# Patient Record
Sex: FEMALE | Race: WHITE | Employment: PART TIME | URBAN - METROPOLITAN AREA
[De-identification: names, ages, dates, MRNs, and addresses within clinical notes are randomized per-mention and may not be internally consistent; named-entity substitution may affect disease eponyms.]

---

## 2022-08-26 ENCOUNTER — APPOINTMENT (OUTPATIENT)
Dept: GENERAL RADIOLOGY | Age: 42
End: 2022-08-26
Payer: MEDICAID

## 2022-08-26 ENCOUNTER — HOSPITAL ENCOUNTER (EMERGENCY)
Age: 42
Discharge: HOME | End: 2022-08-26
Attending: EMERGENCY MEDICINE
Payer: MEDICAID

## 2022-08-26 VITALS
TEMPERATURE: 98.7 F | SYSTOLIC BLOOD PRESSURE: 138 MMHG | HEIGHT: 68 IN | RESPIRATION RATE: 16 BRPM | DIASTOLIC BLOOD PRESSURE: 82 MMHG | WEIGHT: 287.6 LBS | HEART RATE: 81 BPM | BODY MASS INDEX: 43.59 KG/M2 | OXYGEN SATURATION: 98 %

## 2022-08-26 DIAGNOSIS — F41.1 ANXIETY STATE: Primary | ICD-10-CM

## 2022-08-26 DIAGNOSIS — L01.00 IMPETIGO: ICD-10-CM

## 2022-08-26 LAB
A/G RATIO: 1.8 (ref 1.1–2.2)
ALBUMIN SERPL-MCNC: 4.4 G/DL (ref 3.4–5)
ALP BLD-CCNC: 72 U/L (ref 40–129)
ALT SERPL-CCNC: 29 U/L (ref 10–40)
ANION GAP SERPL CALCULATED.3IONS-SCNC: 13 MMOL/L (ref 3–16)
AST SERPL-CCNC: 17 U/L (ref 15–37)
BASOPHILS ABSOLUTE: 0.1 K/UL (ref 0–0.2)
BASOPHILS RELATIVE PERCENT: 0.5 %
BILIRUB SERPL-MCNC: 0.4 MG/DL (ref 0–1)
BUN BLDV-MCNC: 12 MG/DL (ref 7–20)
CALCIUM SERPL-MCNC: 9.4 MG/DL (ref 8.3–10.6)
CHLORIDE BLD-SCNC: 104 MMOL/L (ref 99–110)
CO2: 21 MMOL/L (ref 21–32)
CREAT SERPL-MCNC: 0.7 MG/DL (ref 0.6–1.1)
EOSINOPHILS ABSOLUTE: 0.2 K/UL (ref 0–0.6)
EOSINOPHILS RELATIVE PERCENT: 1.1 %
GFR AFRICAN AMERICAN: >60
GFR NON-AFRICAN AMERICAN: >60
GLUCOSE BLD-MCNC: 99 MG/DL (ref 70–99)
HCG QUALITATIVE: NEGATIVE
HCT VFR BLD CALC: 44.7 % (ref 36–48)
HEMOGLOBIN: 14.7 G/DL (ref 12–16)
IMMATURE GRANULOCYTES #: 0 K/UL (ref 0–0.2)
IMMATURE GRANULOCYTES %: 0.3 %
LYMPHOCYTES ABSOLUTE: 2.1 K/UL (ref 1–5.1)
LYMPHOCYTES RELATIVE PERCENT: 15.5 %
MCH RBC QN AUTO: 29.3 PG (ref 26–34)
MCHC RBC AUTO-ENTMCNC: 32.9 G/DL (ref 32–36.4)
MCV RBC AUTO: 89 FL (ref 80–100)
MONOCYTES ABSOLUTE: 0.9 K/UL (ref 0–1.3)
MONOCYTES RELATIVE PERCENT: 7.1 %
NEUTROPHILS ABSOLUTE: 10.1 K/UL (ref 1.7–7.7)
NEUTROPHILS RELATIVE PERCENT: 75.5 %
PDW BLD-RTO: 13.3 % (ref 12.4–15.4)
PLATELET # BLD: 240 K/UL (ref 135–450)
PMV BLD AUTO: 11.5 FL (ref 5–10.5)
POTASSIUM REFLEX MAGNESIUM: 3.9 MMOL/L (ref 3.5–5.1)
RBC # BLD: 5.02 M/UL (ref 4–5.2)
SODIUM BLD-SCNC: 138 MMOL/L (ref 136–145)
TOTAL PROTEIN: 6.9 G/DL (ref 6.4–8.2)
TROPONIN: <0.01 NG/ML
WBC # BLD: 13.3 K/UL (ref 4–11)

## 2022-08-26 PROCEDURE — 93005 ELECTROCARDIOGRAM TRACING: CPT | Performed by: EMERGENCY MEDICINE

## 2022-08-26 PROCEDURE — 36415 COLL VENOUS BLD VENIPUNCTURE: CPT

## 2022-08-26 PROCEDURE — 71046 X-RAY EXAM CHEST 2 VIEWS: CPT

## 2022-08-26 PROCEDURE — 99285 EMERGENCY DEPT VISIT HI MDM: CPT

## 2022-08-26 PROCEDURE — 84484 ASSAY OF TROPONIN QUANT: CPT

## 2022-08-26 PROCEDURE — 84439 ASSAY OF FREE THYROXINE: CPT

## 2022-08-26 PROCEDURE — 84443 ASSAY THYROID STIM HORMONE: CPT

## 2022-08-26 PROCEDURE — 80053 COMPREHEN METABOLIC PANEL: CPT

## 2022-08-26 PROCEDURE — 85025 COMPLETE CBC W/AUTO DIFF WBC: CPT

## 2022-08-26 PROCEDURE — 6370000000 HC RX 637 (ALT 250 FOR IP): Performed by: EMERGENCY MEDICINE

## 2022-08-26 PROCEDURE — 84703 CHORIONIC GONADOTROPIN ASSAY: CPT

## 2022-08-26 RX ORDER — MUPIROCIN CALCIUM 20 MG/G
CREAM TOPICAL 3 TIMES DAILY
Qty: 30 G | Refills: 0 | Status: SHIPPED | OUTPATIENT
Start: 2022-08-26 | End: 2022-09-25

## 2022-08-26 RX ORDER — HYDROXYZINE HYDROCHLORIDE 25 MG/1
25 TABLET, FILM COATED ORAL EVERY 8 HOURS PRN
Qty: 30 TABLET | Refills: 0 | Status: SHIPPED | OUTPATIENT
Start: 2022-08-26 | End: 2022-09-05

## 2022-08-26 RX ORDER — HYDROXYZINE PAMOATE 25 MG/1
25 CAPSULE ORAL ONCE
Status: COMPLETED | OUTPATIENT
Start: 2022-08-26 | End: 2022-08-26

## 2022-08-26 RX ADMIN — HYDROXYZINE PAMOATE 25 MG: 25 CAPSULE ORAL at 18:36

## 2022-08-26 ASSESSMENT — PAIN SCALES - GENERAL: PAINLEVEL_OUTOF10: 0

## 2022-08-26 ASSESSMENT — PAIN - FUNCTIONAL ASSESSMENT
PAIN_FUNCTIONAL_ASSESSMENT: NONE - DENIES PAIN
PAIN_FUNCTIONAL_ASSESSMENT: 0-10

## 2022-08-26 NOTE — DISCHARGE INSTRUCTIONS
You have been prescribed medication, hydroxyzine, that causes drowsiness. While this is not a problem under normal circumstances, when taken with alcohol or while driving or operating heavy machinery, this medicine could cause you to become too sleepy and/or hurt yourself or others. Therefore, it is very important that you DO NOT DRIVE, DRINK ALCOHOL, OR OPERATE HEAVY MACHINERY WHILE TAKING THE MEDICINE(S) LISTED ABOVE.     Bactroban topically, as prescribed

## 2022-08-26 NOTE — ED PROVIDER NOTES
[facility]    CHIEF COMPLAINT  Anxiety (1 hour ago patient started to have a hot flash and feel shaky. Her watch read A-fib. She started having anxiety. She took her watch off. She states, she is feeling better. She has hx of anxiety )       HISTORY OF PRESENT ILLNESS  Allegra Beach is a 38 y.o. female who presents to the ED with complaint of anxiety and concerned that her watch indicated that she was in atrial fibrillation. The patient reports chronic anxiety for which she takes Celexa. She is also prescribed a benzodiazepine for breakthrough symptoms of anxiety, however she declines to take it setting history of substance abuse in her family. She had some recent life stressors centered around an ill parent. She denies any suicidal ideation, homicidal ideation, or hallucinations. Today, she was feeling quite anxious and her watch indicated that she was in atrial fibrillation. She is unsure if she had palpitations but did state that her heart rate was fast as her watch indicated heart rate just over 100. She had a slight burning sensation in her chest during the episode but is chest pain-free at this time. She denies fever, shortness of breath, nausea, vomiting, diarrhea, abdominal pain, leg swelling, calf pain. No other complaints, modifying factors or associated symptoms.      I have reviewed the following from the nursing documentation:    Past Medical History:   Diagnosis Date    Anxiety     Seasonal allergies      Past Surgical History:   Procedure Laterality Date    PRE-MALIGNANT / BENIGN SKIN LESION EXCISION  2010    right cheek below eye    WISDOM TOOTH EXTRACTION  2009     Family History   Problem Relation Age of Onset    High Blood Pressure Mother      Social History     Socioeconomic History    Marital status:      Spouse name: Not on file    Number of children: Not on file    Years of education: Not on file    Highest education level: Not on file Occupational History    Not on file   Tobacco Use    Smoking status: Some Days     Types: Cigarettes     Last attempt to quit: 2009     Years since quittin.1    Smokeless tobacco: Never   Substance and Sexual Activity    Alcohol use: Yes     Alcohol/week: 1.0 standard drink     Types: 1 Standard drinks or equivalent per week     Comment: occ    Drug use: No    Sexual activity: Yes   Other Topics Concern    Not on file   Social History Narrative    Not on file     Social Determinants of Health     Financial Resource Strain: Not on file   Food Insecurity: Not on file   Transportation Needs: Not on file   Physical Activity: Not on file   Stress: Not on file   Social Connections: Not on file   Intimate Partner Violence: Not on file   Housing Stability: Not on file     No current facility-administered medications for this encounter. Current Outpatient Medications   Medication Sig Dispense Refill    hydrOXYzine HCl (ATARAX) 25 MG tablet Take 1 tablet by mouth every 8 hours as needed for Itching or Anxiety 30 tablet 0    mupirocin (BACTROBAN) 2 % cream Apply topically 3 times daily Apply topically 3 times daily. 30 g 0    montelukast (SINGULAIR) 10 MG tablet Take 10 mg by mouth nightly      citalopram (CELEXA) 10 MG tablet Take 40 mg by mouth daily      fexofenadine (ALLEGRA) 180 MG tablet Take 180 mg by mouth daily      fluticasone (FLONASE) 50 MCG/ACT nasal spray 1 spray by Nasal route daily      cetirizine (ZYRTEC) 10 MG tablet Take 10 mg by mouth daily       Allergies   Allergen Reactions    Augmentin [Amoxicillin-Pot Clavulanate]     Bactrim [Sulfamethoxazole-Trimethoprim]     Codeine     Keflex [Cephalexin]        REVIEW OF SYSTEMS  10 systems reviewed, pertinent positives and negatives per HPI, otherwise noted to be negative.     PHYSICAL EXAM  ED Triage Vitals [22 1819]   BP Temp Temp Source Heart Rate Resp SpO2 Height Weight   (!) 157/96 98.3 °F (36.8 °C) Oral 92 18 98 % 5' 8\" (1.727 m) 287 lb 9.6 oz (130.5 kg)     General appearance: Awake and alert. Cooperative. No acute distress. HENT: Normocephalic. Atraumatic. Mucous membranes are moist.  Neck: Supple. Eyes: PERRL. EOMI. Heart/Chest: RRR. No murmurs. Lungs: Respirations unlabored. CTAB. Good air exchange. Speaking comfortably in full sentences. Abdomen: Soft. Non-tender. Non-distended. No rebound or guarding. Musculoskeletal: No extremity edema. No calf tenderness or palpable cord. No deformity. No tenderness in the extremities. All extremities neurovascularly intact. Skin: Warm and dry. Diffuse circular erythematous small maculopapular lesions on the upper and lower extremities with surrounding erythema, no induration or fluctuance, no purulent discharge. Neurological: Alert and oriented. CN II-XII intact. Strength 5/5 bilateral upper and lower extremities. Sensation intact to light touch. Gait normal.  Psychiatric: Mood/affect: normal      LABS  I have reviewed all labs for this visit.    Results for orders placed or performed during the hospital encounter of 08/26/22   CBC with Auto Differential   Result Value Ref Range    WBC 13.3 (H) 4.0 - 11.0 K/uL    RBC 5.02 4.00 - 5.20 M/uL    Hemoglobin 14.7 12.0 - 16.0 g/dL    Hematocrit 44.7 36.0 - 48.0 %    MCV 89.0 80.0 - 100.0 fL    MCH 29.3 26.0 - 34.0 pg    MCHC 32.9 32.0 - 36.4 g/dL    RDW 13.3 12.4 - 15.4 %    Platelets 535 493 - 318 K/uL    MPV 11.5 (H) 5.0 - 10.5 fL    Neutrophils % 75.5 %    Immature Granulocytes % 0.3 %    Lymphocytes % 15.5 %    Monocytes % 7.1 %    Eosinophils % 1.1 %    Basophils % 0.5 %    Neutrophils Absolute 10.1 (H) 1.7 - 7.7 K/uL    Immature Granulocytes # 0.0 0.0 - 0.2 K/uL    Lymphocytes Absolute 2.1 1.0 - 5.1 K/uL    Monocytes Absolute 0.9 0.0 - 1.3 K/uL    Eosinophils Absolute 0.2 0.0 - 0.6 K/uL    Basophils Absolute 0.1 0.0 - 0.2 K/uL   Comprehensive Metabolic Panel w/ Reflex to MG   Result Value Ref Range    Sodium 138 136 - 145 mmol/L Potassium reflex Magnesium 3.9 3.5 - 5.1 mmol/L    Chloride 104 99 - 110 mmol/L    CO2 21 21 - 32 mmol/L    Anion Gap 13 3 - 16    Glucose 99 70 - 99 mg/dL    BUN 12 7 - 20 mg/dL    Creatinine 0.7 0.6 - 1.1 mg/dL    GFR Non-African American >60 >60    GFR African American >60 >60    Calcium 9.4 8.3 - 10.6 mg/dL    Total Protein 6.9 6.4 - 8.2 g/dL    Albumin 4.4 3.4 - 5.0 g/dL    Albumin/Globulin Ratio 1.8 1.1 - 2.2    Total Bilirubin 0.4 0.0 - 1.0 mg/dL    Alkaline Phosphatase 72 40 - 129 U/L    ALT 29 10 - 40 U/L    AST 17 15 - 37 U/L   TSH with Reflex   Result Value Ref Range    TSH 7.05 (H) 0.27 - 4.20 uIU/mL   Troponin   Result Value Ref Range    Troponin <0.01 <0.01 ng/mL   HCG Qualitative, Serum   Result Value Ref Range    hCG Qual Negative Detects HCG level >10 MIU/mL   T4, Free   Result Value Ref Range    T4 Free 1.2 0.9 - 1.8 ng/dL   EKG 12 Lead   Result Value Ref Range    Ventricular Rate 85 BPM    Atrial Rate 85 BPM    P-R Interval 162 ms    QRS Duration 90 ms    Q-T Interval 386 ms    QTc Calculation (Bazett) 459 ms    P Axis 14 degrees    R Axis 16 degrees    T Axis 26 degrees    Diagnosis       Normal sinus rhythmNormal ECGNo previous ECGs availableConfirmed by Robyn Ma MD, Samara Lucero ([id number]) on 8/28/2022 1:52:01 PM       RADIOLOGY  I have reviewed all radiographic studies for this visit. XR CHEST (2 VW)   Final Result   No acute process. ECG  EKG interpreted by myself. Rate: normal  Rhythm: NSR  Axis: normal  Intervals: within normal limits  ST Segments: no acute abnormality  T waves: no acute abnormality  Comparison: no prior   Impression: NSR with no acute abnormality     ED COURSE/MDM  Patient seen and evaluated. Old records reviewed. Labs and imaging reviewed and results discussed with patient/family to extent possible. This is a well-appearing 19-year-old female who presents with complaint of anxiety and a dysrhythmia reported by her smart watch.   On arrival the patient is slightly hypertensive with otherwise reassuring vital signs. Cardiac exam reveals a regular rate and rhythm, and EKG shows normal sinus rhythm with no acute abnormality. The patient complained of some burning sensation in her chest during the episode which I think is likely to be anxiety related however will obtain a chest x-ray and troponin. Will obtain usual screening laboratory studies. Discussed with patient use of hydroxyzine and lieu of a benzodiazepine for her symptoms of anxiety and she consents to trial it in the emergency department. Anticipate will send her home with a prescription for the same, and precautions regarding the sedating nature of this medication were communicated. We will keep the patient on the monitor in the emergency department to assess for dysrhythmias. Ultimately, however, if symptoms are able to be controlled and screening laboratory studies are reassuring, anticipate will discharge home with close follow-up with PCP in several days. If patient develops palpitations or other similar related symptoms, or if her smart watch again alerts to dysrhythmia, patient may benefit from Holter monitoring in the outpatient setting. Patient feeling much better after administration of hydroxyzine. Renal panel no significant electrolyte abnormalities or creatinine elevation. CBC mild leukocytosis, clinically insignificant with no source of an infection. No anemia. Troponin within normal limits. Pregnancy test negative. TSH was elevated however free T4 within normal limits. Patient manifested no dysrhythmias while in the emergency department. Separately, the patient was noted to have a number of skin lesions that look consistent with bug bites likely with superimposed impetigo. Will prescribe Bactroban. Discharge as above with usual strict return precautions communicated. Is this patient to be included in the SEP-1 Core Measure?   No   Exclusion criteria - the patient is NOT to be included for SEP-1 Core Measure due to: Infection is not suspected    IGracy MD, am the primary clinician of record. During the patient's ED course, the patient was given:  Medications   hydrOXYzine pamoate (VISTARIL) capsule 25 mg (25 mg Oral Given 8/26/22 6828)        All questions were answered and the patient/family expressed understanding and agreement with the plan. PROCEDURES  None    CRITICAL CARE  N/A    CLINICAL IMPRESSION  1. Anxiety state    2. Impetigo        DISPOSITION   discharge     Gracy Cordova MD    Note: This chart was created using voice recognition dictation software. Efforts were made by me to ensure accuracy, however some errors may be present due to limitations of this technology and occasionally words are not transcribed correctly.         Gracy Cordova MD  08/29/22 [id number]

## 2022-08-26 NOTE — ED PROVIDER NOTES
[address] [facility] called, stating patient's insurance will not cover Bactroban cream but will cover bactroban ointment. I authorized the change.      Yasmin Pat MD  08/26/22 [id number]

## 2022-08-27 LAB
T4 FREE: 1.2 NG/DL (ref 0.9–1.8)
TSH REFLEX: 7.05 UIU/ML (ref 0.27–4.2)

## 2022-08-27 NOTE — ED NOTES
Initial contact with patient upon discharge. Patient is sinus rhythm on cardiac monitor. Patient denies pain and states she feels better. Discharge instructions reviewed with patient and verbalized understanding, denies further questions and successful teach back occurred. Offered wheelchair for discharge and declined. Discharged ambulatory with steady gait to ED lobby. Written discharge instructions provided to patient. Prescriptions x2 sent electronically to patient's pharmacy.      Michelle Miguel RN  08/26/22 [id number]

## 2022-08-27 NOTE — RESULT ENCOUNTER NOTE
Culture reviewed, the patient's TSH was elevated. She should follow-up with her primary care provider for further evaluation of this. I routed a note to her PCP about the abnormal TSH as well.

## 2022-08-28 LAB
EKG ATRIAL RATE: 85 BPM
EKG DIAGNOSIS: NORMAL
EKG P AXIS: 14 DEGREES
EKG P-R INTERVAL: 162 MS
EKG Q-T INTERVAL: 386 MS
EKG QRS DURATION: 90 MS
EKG QTC CALCULATION (BAZETT): 459 MS
EKG R AXIS: 16 DEGREES
EKG T AXIS: 26 DEGREES
EKG VENTRICULAR RATE: 85 BPM

## 2022-08-28 PROCEDURE — 93010 ELECTROCARDIOGRAM REPORT: CPT | Performed by: INTERNAL MEDICINE
